# Patient Record
Sex: FEMALE | Race: WHITE
[De-identification: names, ages, dates, MRNs, and addresses within clinical notes are randomized per-mention and may not be internally consistent; named-entity substitution may affect disease eponyms.]

---

## 2018-04-24 ENCOUNTER — HOSPITAL ENCOUNTER (EMERGENCY)
Dept: HOSPITAL 68 - ERH | Age: 23
End: 2018-04-24
Payer: COMMERCIAL

## 2018-04-24 VITALS — DIASTOLIC BLOOD PRESSURE: 85 MMHG | SYSTOLIC BLOOD PRESSURE: 130 MMHG

## 2018-04-24 DIAGNOSIS — S39.012A: Primary | ICD-10-CM
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
## 2018-04-24 NOTE — RADIOLOGY REPORT
EXAMINATION:
XR LUMBOSACRAL SPINE
 
CLINICAL INFORMATION:
Low back pain.
 
COMPARISON:
CT abdomen and pelvis 06/13/2010.
 
TECHNIQUE:
4 views of the lumbosacral spine were obtained.
 
FINDINGS:
Alignment is normal. Vertebral body heights are preserved. No acute fracture
and no spinal subluxation. Sacroiliac joints are symmetric. Visualized bowel
gas pattern is normal.
 
IMPRESSION:
Normal lumbar spine radiographs.

## 2018-04-24 NOTE — ED NECK/BACK PAIN COMPLAINT
History of Present Illness
 
General
Chief Complaint: Low Back Pain/Injury
Stated Complaint: BIBA, BACK PAIN
Source: patient
Exam Limitations: no limitations
 
Vital Signs & Intake/Output
Vital Signs & Intake/Output
 Vital Signs
 
 
Date Time Temp Pulse Resp B/P B/P Pulse O2 O2 Flow FiO2
 
     Mean Ox Delivery Rate 
 
 1610 98.9 78 20 130/85  98 Room Air  
 
 1351 98.7 85 17 124/85  98 Room Air  
 
 
 
Allergies
Coded Allergies:
NO KNOWN ALLERGIES (NONE 18)
 
Reconcile Medications
Cyclobenzaprine HCl 10 MG TABLET   1 TAB PO TID SPASMS
Ethinyl Estradiol/Drospirenone (Gianvi 3 MG-0.02 MG Tablet) 0.02 MG-3 MG (24) 
TABLET   1 TAB PO DAILY BC  (Reported)
Ibuprofen 800 MG TABLET   1 TAB PO TID PAIN
Oxycodone HCl/Acetaminophen (Percocet 5-325 MG Tablet) 5 MG-325 MG TABLET   1-2 
TAB PO Q6P PRN PAIN
Sertraline HCl 50 MG TABLET   1 TAB PO DAILY MENTAL HEALTH  (Reported)
 
Triage Note:
PT TO ED BY AMBULANCE S/P EXPERIENCING LEFT HIP
 PAIN/ LOWER BACK PAIN AFTER AN OVERWEIGHT STUDENT
 PUT ALL OF HIS WEIGHT ON HER. DENIES TRAUMA OR
 FALLING.
Triage Nurses Notes Reviewed? yes
Onset: Abrupt
Duration: hour(s):, constant
Timing: single episode today
Quality/Severity: moderate, severe
Location: lumbar spine
Loss of Consciousness: no loss of consciousness
Pregnant: No
Patient currently breastfeeds: No
HPI:
22-year-old female comes into the emergency room for further evaluation of left 
lower back pain.  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  She was helping one of the students up with then 
presumed to fall forward on her and she had to catch his entire weight.  She 
reports that she experiencing some sudden onset left lower back pain while 
trying to hold up the child who is overweight.  She was brought in by ambulance.
 She's been experiencing left-sided back pain with some numbness going down her 
left leg.  Denies any urinary bowel dysfunction.  Pain is sharp.  Continuous.
 
Past History
 
Travel History
Traveled to [place] past 21 day No
 
Medical History
Any Pertinent Medical History? see below for history
Neurological: NONE
EENT: NONE
Cardiovascular: NONE
Respiratory: NONE
Gastrointestinal: NONE
Hepatic: NONE
Renal: NONE
Musculoskeletal: NONE
Psychiatric: NONE
Endocrine: NONE
Blood Disorders: NONE
Cancer(s): NONE
GYN/Reproductive: NONE
 
Surgical History
Surgical History: N
 
Psychosocial History
What is your primary language English
Tobacco Use: Never used
Daily Tobacco Use Amount/Type: =< 4 Cigarettes daily
ETOH Use: occasional use
Illicit Drug Use: denies illicit drug use
 
Family History
Hx Contributory? No
 
Review of Systems
 
Review of Systems
Constitutional:
Reports: no symptoms. 
Eyes:
Reports: no symptoms. 
Ears, Nose, Throat, Mouth:
Reports: no symptoms. 
Respiratory:
Reports: no symptoms. 
Cardiovascular:
Reports: no symptoms. 
Gastrointestinal/Abdominal:
Reports: no symptoms. 
Musculoskeletal:
Reports: see HPI. 
Skin:
Reports: no symptoms. 
Neurological/Psychological:
Reports: no symptoms. 
All Other Systems: Reviewed and Negative
 
Physical Exam
 
Physical Exam
General Appearance: well developed/nourished, mild distress
Head: atraumatic
Eyes:
Bilateral: normal appearance. 
Ears, Nose, Throat, Mouth: hearing grossly normal, moist mucous membrane
Neck: normal inspection, full range of motion
Respiratory: normal breath sounds, no respiratory distress
Back: normal inspection, left lower back pain over paraspinal region
Extremities: normal range of motion
Motor:
   Deficit L4 Right: No
   Deficit L4 Left: No
   Deficit L5 Right: No
   Deficit L5 Left: No
   Deficit S1 Right: No
   Deficit S1 Right: No
Neurologic/Psych: awake, alert, oriented x 3, normal mood/affect
Skin: intact, normal color, warm/dry
 
Core Measures
CVA/TIA Diagnosis: No
 
Progress
Differential Diagnosis: cauda equina syn, herniated disc, myofascial strain, 
sciatica, spinal cord inj, muscle strain
Plan of Care:
 Orders
 
 
Procedure Date/time Status
 
URINE PREGNANCY 1434 Complete
 
 
 Laboratory Tests
 
 
 
18 1440:
Urine Pregnancy Test NEGATIVE
 
Diagnostic Imaging:
Viewed by Me: Radiology Read.  Discussed w/RAD: Radiology Read. 
Radiology Impression: PATIENT: IRINA MAZARIEGOS  MEDICAL RECORD NO: [id number] PRESENT
AGE: 22  PATIENT ACCOUNT NO: [id number] : 93  LOCATION: [facility] ORDERING 
PHYSICIAN: Pipo PARKER     SERVICE DATE: 18- EXAM TYPE: RAD - 
XRY-LUMBOSACRAL SPINE 4 VIEWS EXAMINATION: XR LUMBOSACRAL SPINE CLINICAL 
INFORMATION: Low back pain. COMPARISON: CT abdomen and pelvis 2010. 
TECHNIQUE: 4 views of the lumbosacral spine were obtained. FINDINGS: Alignment 
is normal. Vertebral body heights are preserved. No acute fracture and no spinal
subluxation. Sacroiliac joints are symmetric. Visualized bowel gas pattern is 
normal. IMPRESSION: Normal lumbar spine radiographs. DICTATED BY: Robbi Roldan MD  DATE/TIME DICTATED:18 :MARCUS  DATE/
TIME TRANSCRIBED:18 CONFIDENTIAL, DO NOT COPY WITHOUT APPROPRIATE 
AUTHORIZATION.  <Electronically signed in Other Vendor System>                  
                                                                     SIGNED BY: 
Robbi Roldan MD 18
 
Departure
 
Departure
Disposition: HOME OR SELF CARE
Condition: Stable
Clinical Impression
Primary Impression: Strain of muscle, fascia and tendon of lower back, initial 
encounter
Referrals:
Irina Farooq MD (PCP/Family)
 
Additional Instructions:
Take ibuprofen and Flexeril as prescribed.  Follow-up with your primary care 
doctor.  Return if any concerns worsening symptoms.  Follow-up with occupational
medicine.  No driving while taking the Percocet.  Do not take Percocet while you
are at work.
 
Please go over all results of today's visit with your primary care doctor.  
Contact your primary care doctor to let them know you were here in the emergency
room.  There may be nonspecific findings which may not be related to your visit 
today here in the emergency room but may require further evaluation and chronic 
monitoring by your primary care doctor.  If you had a laceration today the 
chance of foreign body always remains. You should follow-up with your primary 
care doctor for recheck in 3-5 days for a wound check.  If you had an x-ray done
there is a chance that a fracture could have been missed on initial read and you
should follow-up with your primary care doctor for repeat x-rays if symptoms 
persist.  If your blood pressure was elevated here in the emergency room please 
have rechecked by [facility] primary care doctor within the next 48.  If you were 
prescribed a narcotic here in the emergency room or any type of controlled 
substances you're not allowed to drive while taking this medication or operate 
any type of heavy machinery.  Narcotics can make you feel lightheaded dizziness 
nausea and can cause constipation.  You may need to  a stool softener.  
Thank you for choosing [facility] emergency room.  Please return to the 
emergency room immediately if you have any other concerns worsening of symptoms.
 
Departure Forms:
Customer Survey
General Discharge Information
Prescriptions:
Current Visit Scripts
Oxycodone HCl/Acetaminophen (Percocet 5-325 MG Tablet) 1-2 TAB PO Q6P PRN PAIN 
     #20 TAB 
 
Ibuprofen 1 TAB PO TID  
     #30 TAB Ref 1
 
Cyclobenzaprine HCl 1 TAB PO TID  
     #30 TAB 
 
 
Comments
2018 5:26:32 PM
 
Patient's pain is all reproducible and lower back.  Pain is worse with range of 
motion.  No evidence of radiculopathy on exam.  No urinary bowel dysfunction.  
No genital numbness.  No weakness in the lower extremity.  Normal dorsiflexion 
of great toe.  Gross sensation intact.  No saddle paresthesia.  Considered 
things like cauda equina have a do not feel that patient's symptoms at this 
point in time are consistent with this diagnosis.  Patient reevaluated multiple 
times.  Patient has no other symptoms that could be  attributing to back pain.  
No abdominal pain, shortness of breath, chest pain.  Patient is to follow-up 
with primary care doctor for recheck.  If symptoms persist patient should be 
considered for an MRI of the lower back.  Patient is to return immediately if 
any nausea vomiting, fever, weakness in the legs, urinary bowel dysfunction, 
abdominal pain, chest pain, shortness of breath.  No weight loss.  No night 
sweats.  Pain is consistent with musculoskeletal pain due to the patient's 
symptoms mentioned above.  Patient did not fall to the ground.  Therefore there 
is no concern for hip fracture.  She has full range of motion of her hips 
bilaterally.